# Patient Record
Sex: MALE | Race: WHITE
[De-identification: names, ages, dates, MRNs, and addresses within clinical notes are randomized per-mention and may not be internally consistent; named-entity substitution may affect disease eponyms.]

---

## 2018-05-13 ENCOUNTER — HOSPITAL ENCOUNTER (EMERGENCY)
Dept: HOSPITAL 17 - NEPD | Age: 23
Discharge: HOME | End: 2018-05-13
Payer: SELF-PAY

## 2018-05-13 VITALS
HEART RATE: 106 BPM | OXYGEN SATURATION: 100 % | SYSTOLIC BLOOD PRESSURE: 182 MMHG | TEMPERATURE: 98.9 F | DIASTOLIC BLOOD PRESSURE: 87 MMHG | RESPIRATION RATE: 18 BRPM

## 2018-05-13 VITALS — BODY MASS INDEX: 23.03 KG/M2 | HEIGHT: 66 IN | WEIGHT: 143.3 LBS

## 2018-05-13 DIAGNOSIS — Z72.0: ICD-10-CM

## 2018-05-13 DIAGNOSIS — Z23: ICD-10-CM
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
## 2018-05-13 DIAGNOSIS — R00.0: ICD-10-CM

## 2018-05-13 DIAGNOSIS — S90.465A: Primary | ICD-10-CM

## 2018-05-13 LAB
BASOPHILS # BLD AUTO: 0 TH/MM3 (ref 0–0.2)
BASOPHILS NFR BLD: 0.3 % (ref 0–2)
BUN SERPL-MCNC: 15 MG/DL (ref 7–18)
CALCIUM SERPL-MCNC: 9.4 MG/DL (ref 8.5–10.1)
CHLORIDE SERPL-SCNC: 101 MEQ/L (ref 98–107)
CREAT SERPL-MCNC: 1.03 MG/DL (ref 0.6–1.3)
CRP SERPL-MCNC: (no result) MG/DL (ref 0–0.3)
EOSINOPHIL # BLD: 0.2 TH/MM3 (ref 0–0.4)
EOSINOPHIL NFR BLD: 2.5 % (ref 0–4)
ERYTHROCYTE [DISTWIDTH] IN BLOOD BY AUTOMATED COUNT: 13.1 % (ref 11.6–17.2)
GFR SERPLBLD BASED ON 1.73 SQ M-ARVRAT: 90 ML/MIN (ref 89–?)
GLUCOSE SERPL-MCNC: 112 MG/DL (ref 74–106)
HCO3 BLD-SCNC: 29.5 MEQ/L (ref 21–32)
HCT VFR BLD CALC: 44 % (ref 39–51)
HGB BLD-MCNC: 15.4 GM/DL (ref 13–17)
LYMPHOCYTES # BLD AUTO: 2.4 TH/MM3 (ref 1–4.8)
LYMPHOCYTES NFR BLD AUTO: 36.1 % (ref 9–44)
MCH RBC QN AUTO: 33.8 PG (ref 27–34)
MCHC RBC AUTO-ENTMCNC: 35 % (ref 32–36)
MCV RBC AUTO: 96.6 FL (ref 80–100)
MONOCYTE #: 0.5 TH/MM3 (ref 0–0.9)
MONOCYTES NFR BLD: 7.6 % (ref 0–8)
NEUTROPHILS # BLD AUTO: 3.6 TH/MM3 (ref 1.8–7.7)
NEUTROPHILS NFR BLD AUTO: 53.5 % (ref 16–70)
PLATELET # BLD: 269 TH/MM3 (ref 150–450)
PMV BLD AUTO: 8.2 FL (ref 7–11)
RBC # BLD AUTO: 4.56 MIL/MM3 (ref 4.5–5.9)
SODIUM SERPL-SCNC: 139 MEQ/L (ref 136–145)
WBC # BLD AUTO: 6.7 TH/MM3 (ref 4–11)

## 2018-05-13 PROCEDURE — 96365 THER/PROPH/DIAG IV INF INIT: CPT

## 2018-05-13 PROCEDURE — 86140 C-REACTIVE PROTEIN: CPT

## 2018-05-13 PROCEDURE — 73701 CT LOWER EXTREMITY W/DYE: CPT

## 2018-05-13 PROCEDURE — 85652 RBC SED RATE AUTOMATED: CPT

## 2018-05-13 PROCEDURE — 87070 CULTURE OTHR SPECIMN AEROBIC: CPT

## 2018-05-13 PROCEDURE — 73620 X-RAY EXAM OF FOOT: CPT

## 2018-05-13 PROCEDURE — 85025 COMPLETE CBC W/AUTO DIFF WBC: CPT

## 2018-05-13 PROCEDURE — 96367 TX/PROPH/DG ADDL SEQ IV INF: CPT

## 2018-05-13 PROCEDURE — 96375 TX/PRO/DX INJ NEW DRUG ADDON: CPT

## 2018-05-13 PROCEDURE — 99285 EMERGENCY DEPT VISIT HI MDM: CPT

## 2018-05-13 PROCEDURE — 90471 IMMUNIZATION ADMIN: CPT

## 2018-05-13 PROCEDURE — 80048 BASIC METABOLIC PNL TOTAL CA: CPT

## 2018-05-13 PROCEDURE — 90714 TD VACC NO PRESV 7 YRS+ IM: CPT

## 2018-05-13 PROCEDURE — 96366 THER/PROPH/DIAG IV INF ADDON: CPT

## 2018-05-13 PROCEDURE — 87040 BLOOD CULTURE FOR BACTERIA: CPT

## 2018-05-13 PROCEDURE — 83605 ASSAY OF LACTIC ACID: CPT

## 2018-05-13 NOTE — PD
HPI


Chief Complaint:  Injury


Time Seen by Provider:  00:24


 (Harley Edmondson MD)


Time Seen by Provider:  00:24


 (Balbir Gupta)


Travel History


International Travel<30 days:  No


Contact w/Intl Traveler<30days:  No


Traveled to known affect area:  No


 (Harley Edmondson MD)





History of Present Illness


HPI


23-year-old male came to the emergency room for left foot swelling after he was 

walking barefoot in a backyard and felt something poke into his middle toe.  

This happened last night.  Patient woke up this morning and could not feel his 

middle toe and noticed significant swelling of his toe as well as the foot.  

The swelling has persisted.  Patient does not really complain of significant 

pain.  He is here because of the swelling.  Patient denies of any fever or 

chills.  Patient was tachycardic in triage.  He is otherwise a healthy person.  

Patient does not recall his last tetanus shot.


 (Harley Edmondson MD)





History


Past Medical History


*** Narrative Medical


List of his past medical, surgical, social and family history is reviewed from 

the nursing note


Medical History:  Denies Significant Hx


Tetanus Vaccination:  < 5 Years


Influenza Vaccination:  No


 (Harley Edmondson MD)





Past Surgical History


Surgical History:  No Previous Surgery


 (Harley Edmondson MD)





Social History


Alcohol Use:  Yes (twice a week)


Tobacco Use:  Yes


 (Harley Edmondson MD)





Allergies-Medications


(Allergen,Severity, Reaction):  


Coded Allergies:  


     No Known Allergies (Verified  Allergy, Mild, 5/13/18)


Comments


No known drug allergies.


 (Harley Edmondson MD)


Reported Meds & Prescriptions





Reported Meds & Active Scripts


Active


Deltasone (Prednisone) 20 Mg Tab 20 Mg PO BID 5 Days


 (Balbir Gupta)


Narrative Medication


List of his home medications reviewed from the nursing note


 (Harley Edmondson MD)





Review of Systems


Except as stated in HPI:  all other systems reviewed are Neg


 (Harley Edmondson MD)





Physical Exam


Narrative


GENERAL: Awake, alert, mildest


SKIN: Focused skin assessment warm/dry.  Left foot dorsum swollen and slightly 

erythematous and warm to touch.  Middle toe appears to be swollen and 

erythematous and there is a punctum on the medial aspect of the toe between the 

first and the second toe.  Wound is not significantly tender.  Poor foot hygiene


HEAD: Atraumatic. Normocephalic. 


EYES: Pupils equal and round. No scleral icterus. No injection or drainage. 


ENT: No nasal bleeding or discharge.  Mucous membranes pink and moist.


NECK: Trachea midline. No JVD. 


CARDIOVASCULAR: Regular rate and rhythm.  No murmur appreciated.


RESPIRATORY: No accessory muscle use. Clear to auscultation. Breath sounds 

equal bilaterally. 


GASTROINTESTINAL: Abdomen soft, non-tender, nondistended. Hepatic and splenic 

margins not palpable. 


MUSCULOSKELETAL: No obvious deformities. No clubbing.  No cyanosis.  No edema. 


NEUROLOGICAL: Awake and alert. No obvious cranial nerve deficits.  Motor 

grossly within normal limits. Normal speech.


PSYCHIATRIC: Appropriate mood and affect; insight and judgment normal.


 (Harley Edmondson MD)





Data


Data


Last Documented VS





Vital Signs








  Date Time  Temp Pulse Resp B/P (MAP) Pulse Ox O2 Delivery O2 Flow Rate FiO2


 


5/13/18 00:11 98.9 106 18 182/87 (118) 100   





 (Balbir Gupta)


Orders





 Orders


Foot, Limited (2vws) (5/13/18 00:25)


Tetanus/Diphtheria Tox Adult (Tetanus/Di (5/13/18 00:30)


Complete Blood Count With Diff (5/13/18 00:33)


Basic Metabolic Panel (Bmp) (5/13/18 00:33)


C-Reactive Protein (Crp) (5/13/18 00:33)


Westergren Sedimentation Rate (5/13/18 00:33)


Iv Access Insert/Monitor (5/13/18 00:33)


Blood Culture (5/13/18 00:33)


Lactic Acid (5/13/18 00:36)


Wound Culture And Gram Stain (5/13/18 00:38)


Ct Foot W Iv Contrast (5/13/18 )


Vancomycin Inj (Vancomycin Inj) (5/13/18 00:45)


Piperacil-Tazo 4.5 Gm Premix (Zosyn 4.5 (5/13/18 01:00)


Iohexol 350 Inj (Omnipaque 350 Inj) (5/13/18 00:08)


Diphenhydramine Inj (Benadryl Inj) (5/13/18 03:30)


Methylprednisolone So Succ Inj (Solumedr (5/13/18 03:30)


Ed Discharge Order (5/13/18 03:55)


 (Balbir Gupta)


Labs





Laboratory Tests








Test


  5/13/18


00:48


 


White Blood Count 6.7 TH/MM3 


 


Red Blood Count 4.56 MIL/MM3 


 


Hemoglobin 15.4 GM/DL 


 


Hematocrit 44.0 % 


 


Mean Corpuscular Volume 96.6 FL 


 


Mean Corpuscular Hemoglobin 33.8 PG 


 


Mean Corpuscular Hemoglobin


Concent 35.0 % 


 


 


Red Cell Distribution Width 13.1 % 


 


Platelet Count 269 TH/MM3 


 


Mean Platelet Volume 8.2 FL 


 


Neutrophils (%) (Auto) 53.5 % 


 


Lymphocytes (%) (Auto) 36.1 % 


 


Monocytes (%) (Auto) 7.6 % 


 


Eosinophils (%) (Auto) 2.5 % 


 


Basophils (%) (Auto) 0.3 % 


 


Neutrophils # (Auto) 3.6 TH/MM3 


 


Lymphocytes # (Auto) 2.4 TH/MM3 


 


Monocytes # (Auto) 0.5 TH/MM3 


 


Eosinophils # (Auto) 0.2 TH/MM3 


 


Basophils # (Auto) 0.0 TH/MM3 


 


CBC Comment DIFF FINAL 


 


Differential Comment  


 


Erythrocyte Sedimentation Rate 3 mm/hr 


 


Blood Urea Nitrogen 15 MG/DL 


 


Creatinine 1.03 MG/DL 


 


Random Glucose 112 MG/DL 


 


Calcium Level 9.4 MG/DL 


 


Sodium Level 139 MEQ/L 


 


Potassium Level 3.8 MEQ/L 


 


Chloride Level 101 MEQ/L 


 


Carbon Dioxide Level 29.5 MEQ/L 


 


Anion Gap 9 MEQ/L 


 


Estimat Glomerular Filtration


Rate 90 ML/MIN 


 


 


Lactic Acid Level 2.0 mmol/L 


 


C-Reactive Protein


  LESS THAN 0.29


MG/DL





 (Balbir Gupta)





[facility]


Medical Decision Making


Medical Screen Exam Complete:  Yes


Emergency Medical Condition:  Yes


Medical Record Reviewed:  Yes


Differential Diagnosis


Cellulitis, allergic reaction secondary to bug bite


Narrative Course


12:44 AM x-ray of the foot and CT scan has been ordered.  Blood test has been 

ordered.  Awaiting for the test results to come back.  Patient will be getting 

IV vancomycin and Zosyn.  Wound culture has been collected.  Case will be 

signed over to the PA in the part to follow-up.


 (Harley Edmondson MD)


Medical Screen Exam Complete:  Yes


Emergency Medical Condition:  Yes


Medical Record Reviewed:  Yes


Interpretation(s)


Left foot: Negative for acute fracture.  No foreign body.  Positive soft tissue 

swelling.


CT left foot: No obvious abscess or space-occupying lesion.  Positive soft 

tissue swelling.





Laboratory Tests








Test


  5/13/18


00:48


 


White Blood Count 6.7 TH/MM3 


 


Red Blood Count 4.56 MIL/MM3 


 


Hemoglobin 15.4 GM/DL 


 


Hematocrit 44.0 % 


 


Mean Corpuscular Volume 96.6 FL 


 


Mean Corpuscular Hemoglobin 33.8 PG 


 


Mean Corpuscular Hemoglobin


Concent 35.0 % 


 


 


Red Cell Distribution Width 13.1 % 


 


Platelet Count 269 TH/MM3 


 


Mean Platelet Volume 8.2 FL 


 


Neutrophils (%) (Auto) 53.5 % 


 


Lymphocytes (%) (Auto) 36.1 % 


 


Monocytes (%) (Auto) 7.6 % 


 


Eosinophils (%) (Auto) 2.5 % 


 


Basophils (%) (Auto) 0.3 % 


 


Neutrophils # (Auto) 3.6 TH/MM3 


 


Lymphocytes # (Auto) 2.4 TH/MM3 


 


Monocytes # (Auto) 0.5 TH/MM3 


 


Eosinophils # (Auto) 0.2 TH/MM3 


 


Basophils # (Auto) 0.0 TH/MM3 


 


CBC Comment DIFF FINAL 


 


Differential Comment  


 


Erythrocyte Sedimentation Rate 3 mm/hr 


 


Blood Urea Nitrogen 15 MG/DL 


 


Creatinine 1.03 MG/DL 


 


Random Glucose 112 MG/DL 


 


Calcium Level 9.4 MG/DL 


 


Sodium Level 139 MEQ/L 


 


Potassium Level 3.8 MEQ/L 


 


Chloride Level 101 MEQ/L 


 


Carbon Dioxide Level 29.5 MEQ/L 


 


Anion Gap 9 MEQ/L 


 


Estimat Glomerular Filtration


Rate 90 ML/MIN 


 


 


Lactic Acid Level 2.0 mmol/L 


 


C-Reactive Protein


  LESS THAN 0.29


MG/DL








Differential Diagnosis


Differential diagnosis: Puncture wound, cellulitis, abscess, necrotizing 

fasciitis, allergic reaction


Narrative Course


IV access is obtained.  Patient is given a liter bolus of saline, vancomycin 

1300 mg IV, 4.5 g of Zosyn IV.  CBC, chemistry, CRP, and sed rate.





Patient's laboratory tests have been reviewed.  His CRP, sed rate and venous 

lactate are negative.  White count is normal.  X-ray of the foot as well as a 

CT scan are unremarkable except for soft tissue swelling.  I suspect that this 

is more of allergic reaction from possible insect bite.  I do not see any signs 

of any localized infection.  His laboratory tests refute any infectious 

process.  The patient is given Solu-Medrol 125 mg IV and Benadryl 50 mg IV.  

The patient will continue Benadryl and oral prednisone at home and recheck in 

48 hours.





This is left foot insect bite with local reaction


 (Balbir Gupta)


Procedures


EKG Prior to Arrival:  No


 (Harley Edmondson MD)





Diagnosis





 Primary Impression:  


 Insect bite of toe of left foot with local reaction


 Qualified Codes:  S90.465A - Insect bite (nonvenomous), left lesser toe(s), 

initial encounter; W57.XXXA - Bitten or stung by nonvenomous insect and other 

nonvenomous arthropods, initial encounter


Referrals:  


[facility]


2 days


Patient Instructions:  General Instructions


Departure Forms:  Tests/Procedures, Work Release   


   Special Instructions:  No work 2 days





***Additional Instructions:  


Rest.


Elevation above the heart at all times.


Warm compresses.


Daily wound care with soap and water.


50 mg of Benadryl 4 times a day.


Prednisone.


Recheck with a primary care doctor or the emergency department in 48 hours.


Return to the ER sooner if any problems develop or worsening symptoms.


***Med/Other Pt SpecificInfo:  Prescription(s) given


 (Balbir Gupta)


Scripts


Prednisone (Deltasone) 20 Mg Tab


20 MG PO BID for 5 Days, #10 TAB 0 Refills


   Prov: Harley Edmondson MD         5/13/18


Disposition:  01 DISCHARGE HOME


Condition:  Stable











Harley Edmondson MD May 13, 2018 00:33


Balbir Gupta May 13, 2018 04:01

## 2018-05-13 NOTE — RADRPT
EXAM DATE/TIME:  05/13/2018 00:36 

 

[facility] COMPARISON:     

No previous studies available for comparison.

 

                     

INDICATIONS :     

Left foot pain with swelling across the metatarsal region. 

                     

 

MEDICAL HISTORY :     

None.          

 

SURGICAL HISTORY :     

None.   

 

ENCOUNTER:     

Initial                                        

 

ACUITY:     

2 days      

 

PAIN SCORE:     

8/10

 

LOCATION:     

Left  foot

 

FINDINGS:     

A limited two-view examination of the left foot was obtained and demonstrates no evidence of fracture
 or malalignment. Metatarsals and phalanges are intact. There is mild soft tissue prominence over the
 dorsum of the foot with no radiopaque foreign body.

 

CONCLUSION:     Mild soft tissue swelling with no underlying bony abnormality. 

 

 

 Michael Le MD on May 13, 2018 at 1:07           

Board Certified Radiologist.

 This report was verified electronically.

## 2018-05-13 NOTE — RADRPT
EXAM DATE/TIME:  05/13/2018 02:18 

 

[facility] COMPARISON:     

No previous studies available for comparison.

 

 

INDICATIONS :     

Left foot injury; possible cellulitis.

                      

 

IV CONTRAST:     

70 cc Omnipaque 350 (iohexol) IV 

 

 

RADIATION DOSE:     

7.29 CTDIvol (mGy) 

 

 

MEDICAL HISTORY :     

None  

 

SURGICAL HISTORY :      

None. 

 

ENCOUNTER:      

Initial

 

ACUITY:      

1 day

 

PAIN SCALE:      

7/10

 

LOCATION:       

Left  foot

 

TECHNIQUE:             

Volumetric scanning of the foot was performed.  Using automated exposure control and adjustment of th
e mA and/or kV according to patient size, radiation dose was kept as low as reasonably achievable to 
obtain optimal diagnostic quality images.  DICOM format image data is available electronically for re
view and comparison.  

 

FINDINGS:     

 

BONES:     

No evidence of fracture.  Alignment is within normal limits. 

 

JOINTS:     

No evidence of joint narrowing or effusion.

 

SOFT TISSUES:     

Muscles, tendons, and neurovascular structures are grossly unremarkable. No evidence of mass, organiz
ed fluid collection, or foreign body. There is soft tissue swelling in the subcutaneous fat greatest 
along the dorsal lateral midfoot.

 

CONCLUSION:     

1. No acute fracture or malalignment.

2. Soft tissue swelling and subcutaneous fat greatest along the dorsal lateral midfoot. No radiopaque
 foreign bodies identified.

 

 

 

 Michael Le MD on May 13, 2018 at 2:42           

Board Certified Radiologist.

 This report was verified electronically.